# Patient Record
Sex: FEMALE | Race: WHITE | NOT HISPANIC OR LATINO | URBAN - NONMETROPOLITAN AREA
[De-identification: names, ages, dates, MRNs, and addresses within clinical notes are randomized per-mention and may not be internally consistent; named-entity substitution may affect disease eponyms.]

---

## 2019-12-12 LAB
EXTERNAL ABO GROUPING: NORMAL
EXTERNAL ANTIBODY SCREEN: NEGATIVE
EXTERNAL HEPATITIS B SURFACE ANTIGEN: NEGATIVE
EXTERNAL HEPATITIS C AB: NORMAL
EXTERNAL RH FACTOR: POSITIVE
EXTERNAL RUBELLA QUALITATIVE: NORMAL
EXTERNAL SYPHILIS RPR SCREEN: NORMAL
HIV1 P24 AG SERPL QL IA: NORMAL

## 2020-06-23 LAB
EXTERNAL CHLAMYDIA SCREEN: NEGATIVE
EXTERNAL GONORRHEA SCREEN: NEGATIVE
EXTERNAL GROUP B STREP ANTIGEN: NEGATIVE

## 2020-07-13 ENCOUNTER — TRANSCRIBE ORDERS (OUTPATIENT)
Dept: ADMINISTRATIVE | Facility: HOSPITAL | Age: 20
End: 2020-07-13

## 2020-07-13 DIAGNOSIS — Z01.818 PREOP EXAMINATION: Primary | ICD-10-CM

## 2020-07-14 ENCOUNTER — LAB (OUTPATIENT)
Dept: LAB | Facility: HOSPITAL | Age: 20
End: 2020-07-14

## 2020-07-14 DIAGNOSIS — Z01.818 PREOP EXAMINATION: ICD-10-CM

## 2020-07-14 PROCEDURE — C9803 HOPD COVID-19 SPEC COLLECT: HCPCS

## 2020-07-14 PROCEDURE — U0002 COVID-19 LAB TEST NON-CDC: HCPCS

## 2020-07-15 LAB
REF LAB TEST METHOD: NORMAL
SARS-COV-2 RNA RESP QL NAA+PROBE: NOT DETECTED

## 2020-07-16 ENCOUNTER — HOSPITAL ENCOUNTER (INPATIENT)
Facility: HOSPITAL | Age: 20
LOS: 4 days | Discharge: HOME | End: 2020-07-20
Attending: OBSTETRICS & GYNECOLOGY | Admitting: OBSTETRICS & GYNECOLOGY

## 2020-07-16 ENCOUNTER — HOSPITAL ENCOUNTER (INPATIENT)
Dept: LABOR AND DELIVERY | Facility: HOSPITAL | Age: 20
Discharge: HOME | End: 2020-07-16

## 2020-07-16 PROBLEM — Z34.90 TERM PREGNANCY: Status: ACTIVE | Noted: 2020-07-16

## 2020-07-16 LAB
ABO GROUP BLD: NORMAL
BASOPHILS # BLD AUTO: 0.04 10*3/MM3 (ref 0–0.2)
BASOPHILS NFR BLD AUTO: 0.4 % (ref 0–1.5)
BLD GP AB SCN SERPL QL: NEGATIVE
DEPRECATED RDW RBC AUTO: 42.3 FL (ref 37–54)
EOSINOPHIL # BLD AUTO: 0.46 10*3/MM3 (ref 0–0.4)
EOSINOPHIL NFR BLD AUTO: 4.4 % (ref 0.3–6.2)
ERYTHROCYTE [DISTWIDTH] IN BLOOD BY AUTOMATED COUNT: 12.9 % (ref 12.3–15.4)
HCT VFR BLD AUTO: 32.1 % (ref 34–46.6)
HGB BLD-MCNC: 10.5 G/DL (ref 12–15.9)
IMM GRANULOCYTES # BLD AUTO: 0.03 10*3/MM3 (ref 0–0.05)
IMM GRANULOCYTES NFR BLD AUTO: 0.3 % (ref 0–0.5)
LYMPHOCYTES # BLD AUTO: 2.08 10*3/MM3 (ref 0.7–3.1)
LYMPHOCYTES NFR BLD AUTO: 19.9 % (ref 19.6–45.3)
MCH RBC QN AUTO: 29.9 PG (ref 26.6–33)
MCHC RBC AUTO-ENTMCNC: 32.7 G/DL (ref 31.5–35.7)
MCV RBC AUTO: 91.5 FL (ref 79–97)
MONOCYTES # BLD AUTO: 0.97 10*3/MM3 (ref 0.1–0.9)
MONOCYTES NFR BLD AUTO: 9.3 % (ref 5–12)
NEUTROPHILS NFR BLD AUTO: 6.86 10*3/MM3 (ref 1.7–7)
NEUTROPHILS NFR BLD AUTO: 65.7 % (ref 42.7–76)
NRBC BLD AUTO-RTO: 0 /100 WBC (ref 0–0.2)
PLATELET # BLD AUTO: 237 10*3/MM3 (ref 140–450)
PMV BLD AUTO: 12.2 FL (ref 6–12)
RBC # BLD AUTO: 3.51 10*6/MM3 (ref 3.77–5.28)
RH BLD: POSITIVE
T&S EXPIRATION DATE: NORMAL
WBC # BLD AUTO: 10.44 10*3/MM3 (ref 3.4–10.8)

## 2020-07-16 PROCEDURE — 86901 BLOOD TYPING SEROLOGIC RH(D): CPT

## 2020-07-16 PROCEDURE — 86850 RBC ANTIBODY SCREEN: CPT | Performed by: OBSTETRICS & GYNECOLOGY

## 2020-07-16 PROCEDURE — 86901 BLOOD TYPING SEROLOGIC RH(D): CPT | Performed by: OBSTETRICS & GYNECOLOGY

## 2020-07-16 PROCEDURE — 59025 FETAL NON-STRESS TEST: CPT

## 2020-07-16 PROCEDURE — 86900 BLOOD TYPING SEROLOGIC ABO: CPT | Performed by: OBSTETRICS & GYNECOLOGY

## 2020-07-16 PROCEDURE — 86900 BLOOD TYPING SEROLOGIC ABO: CPT

## 2020-07-16 PROCEDURE — 85025 COMPLETE CBC W/AUTO DIFF WBC: CPT | Performed by: OBSTETRICS & GYNECOLOGY

## 2020-07-16 RX ORDER — MINERAL OIL
OIL (ML) MISCELLANEOUS ONCE
Status: DISCONTINUED | OUTPATIENT
Start: 2020-07-16 | End: 2020-07-16

## 2020-07-16 RX ORDER — BUDESONIDE AND FORMOTEROL FUMARATE DIHYDRATE 80; 4.5 UG/1; UG/1
2 AEROSOL RESPIRATORY (INHALATION)
Status: DISCONTINUED | OUTPATIENT
Start: 2020-07-17 | End: 2020-07-18

## 2020-07-16 RX ORDER — GLYCERIN/PROPYLENE GLYCOL/WATR
1 SOLUTION, NON-ORAL VAGINAL AS NEEDED
Status: DISCONTINUED | OUTPATIENT
Start: 2020-07-16 | End: 2020-07-18 | Stop reason: HOSPADM

## 2020-07-16 RX ORDER — TERBUTALINE SULFATE 1 MG/ML
0.2 INJECTION, SOLUTION SUBCUTANEOUS AS NEEDED
Status: DISCONTINUED | OUTPATIENT
Start: 2020-07-16 | End: 2020-07-18 | Stop reason: HOSPADM

## 2020-07-16 RX ORDER — ONDANSETRON 2 MG/ML
4 INJECTION INTRAMUSCULAR; INTRAVENOUS EVERY 6 HOURS PRN
Status: DISCONTINUED | OUTPATIENT
Start: 2020-07-16 | End: 2020-07-18 | Stop reason: HOSPADM

## 2020-07-16 RX ORDER — MISOPROSTOL 100 UG/1
25 TABLET ORAL EVERY 4 HOURS PRN
Status: DISCONTINUED | OUTPATIENT
Start: 2020-07-16 | End: 2020-07-18 | Stop reason: HOSPADM

## 2020-07-16 RX ORDER — BUDESONIDE AND FORMOTEROL FUMARATE DIHYDRATE 80; 4.5 UG/1; UG/1
2 AEROSOL RESPIRATORY (INHALATION)
COMMUNITY

## 2020-07-16 RX ORDER — PRENATAL VIT/IRON FUM/FOLIC AC 27MG-0.8MG
1 TABLET ORAL NIGHTLY
COMMUNITY

## 2020-07-16 RX ORDER — MAGNESIUM HYDROXIDE 1200 MG/15ML
1000 LIQUID ORAL ONCE AS NEEDED
Status: DISCONTINUED | OUTPATIENT
Start: 2020-07-16 | End: 2020-07-18 | Stop reason: HOSPADM

## 2020-07-16 RX ORDER — MONTELUKAST SODIUM 10 MG/1
10 TABLET ORAL NIGHTLY
Status: DISCONTINUED | OUTPATIENT
Start: 2020-07-17 | End: 2020-07-18

## 2020-07-16 RX ORDER — MONTELUKAST SODIUM 10 MG/1
10 TABLET ORAL NIGHTLY
COMMUNITY

## 2020-07-16 RX ORDER — CETIRIZINE HYDROCHLORIDE 10 MG/1
10 TABLET ORAL NIGHTLY
Status: DISCONTINUED | OUTPATIENT
Start: 2020-07-17 | End: 2020-07-18

## 2020-07-16 RX ORDER — PRENATAL VIT/IRON FUM/FOLIC AC 27MG-0.8MG
1 TABLET ORAL NIGHTLY
Status: DISCONTINUED | OUTPATIENT
Start: 2020-07-17 | End: 2020-07-18

## 2020-07-16 RX ORDER — ACETAMINOPHEN 325 MG/1
650 TABLET ORAL EVERY 4 HOURS PRN
Status: DISCONTINUED | OUTPATIENT
Start: 2020-07-16 | End: 2020-07-18

## 2020-07-16 RX ORDER — SODIUM CHLORIDE, SODIUM LACTATE, POTASSIUM CHLORIDE, CALCIUM CHLORIDE 600; 310; 30; 20 MG/100ML; MG/100ML; MG/100ML; MG/100ML
125 INJECTION, SOLUTION INTRAVENOUS CONTINUOUS
Status: DISCONTINUED | OUTPATIENT
Start: 2020-07-16 | End: 2020-07-18

## 2020-07-16 RX ORDER — SODIUM CHLORIDE 0.9 % (FLUSH) 0.9 %
3-10 SYRINGE (ML) INJECTION AS NEEDED
Status: DISCONTINUED | OUTPATIENT
Start: 2020-07-16 | End: 2020-07-18 | Stop reason: HOSPADM

## 2020-07-16 RX ORDER — ONDANSETRON 4 MG/1
4 TABLET, FILM COATED ORAL EVERY 6 HOURS PRN
Status: DISCONTINUED | OUTPATIENT
Start: 2020-07-16 | End: 2020-07-18 | Stop reason: HOSPADM

## 2020-07-16 RX ORDER — CETIRIZINE HYDROCHLORIDE 10 MG/1
10 TABLET ORAL NIGHTLY
COMMUNITY

## 2020-07-16 RX ORDER — OXYTOCIN-SODIUM CHLORIDE 0.9% IV SOLN 30 UNIT/500ML 30-0.9/5 UT/ML-%
2-20 SOLUTION INTRAVENOUS
Status: DISCONTINUED | OUTPATIENT
Start: 2020-07-17 | End: 2020-07-18

## 2020-07-16 RX ORDER — BUTORPHANOL TARTRATE 1 MG/ML
1 INJECTION, SOLUTION INTRAMUSCULAR; INTRAVENOUS
Status: DISCONTINUED | OUTPATIENT
Start: 2020-07-16 | End: 2020-07-18 | Stop reason: HOSPADM

## 2020-07-16 RX ORDER — OXYTOCIN-SODIUM CHLORIDE 0.9% IV SOLN 30 UNIT/500ML 30-0.9/5 UT/ML-%
2-20 SOLUTION INTRAVENOUS
Status: DISCONTINUED | OUTPATIENT
Start: 2020-07-17 | End: 2020-07-16

## 2020-07-16 RX ADMIN — SODIUM CHLORIDE, POTASSIUM CHLORIDE, SODIUM LACTATE AND CALCIUM CHLORIDE 125 ML/HR: 600; 310; 30; 20 INJECTION, SOLUTION INTRAVENOUS at 21:52

## 2020-07-16 RX ADMIN — MISOPROSTOL 25 MCG: 100 TABLET ORAL at 22:20

## 2020-07-17 ENCOUNTER — ANESTHESIA EVENT (OUTPATIENT)
Dept: LABOR AND DELIVERY | Facility: HOSPITAL | Age: 20
End: 2020-07-17

## 2020-07-17 ENCOUNTER — ANESTHESIA (OUTPATIENT)
Dept: LABOR AND DELIVERY | Facility: HOSPITAL | Age: 20
End: 2020-07-17

## 2020-07-17 PROCEDURE — C1755 CATHETER, INTRASPINAL: HCPCS | Performed by: ANESTHESIOLOGY

## 2020-07-17 PROCEDURE — C1755 CATHETER, INTRASPINAL: HCPCS

## 2020-07-17 PROCEDURE — 25010000002 ROPIVACAINE PER 1 MG

## 2020-07-17 PROCEDURE — 94640 AIRWAY INHALATION TREATMENT: CPT

## 2020-07-17 PROCEDURE — 94799 UNLISTED PULMONARY SVC/PX: CPT

## 2020-07-17 RX ORDER — ONDANSETRON 2 MG/ML
4 INJECTION INTRAMUSCULAR; INTRAVENOUS ONCE AS NEEDED
Status: DISCONTINUED | OUTPATIENT
Start: 2020-07-17 | End: 2020-07-18 | Stop reason: HOSPADM

## 2020-07-17 RX ORDER — FAMOTIDINE 10 MG/ML
20 INJECTION, SOLUTION INTRAVENOUS ONCE AS NEEDED
Status: DISCONTINUED | OUTPATIENT
Start: 2020-07-17 | End: 2020-07-18 | Stop reason: HOSPADM

## 2020-07-17 RX ORDER — BUPIVACAINE HYDROCHLORIDE 2.5 MG/ML
INJECTION, SOLUTION EPIDURAL; INFILTRATION; INTRACAUDAL
Status: COMPLETED
Start: 2020-07-17 | End: 2020-07-17

## 2020-07-17 RX ORDER — EPHEDRINE SULFATE 50 MG/ML
10 INJECTION, SOLUTION INTRAVENOUS
Status: DISCONTINUED | OUTPATIENT
Start: 2020-07-17 | End: 2020-07-18 | Stop reason: HOSPADM

## 2020-07-17 RX ORDER — ROPIVACAINE HYDROCHLORIDE 2 MG/ML
INJECTION, SOLUTION EPIDURAL; INFILTRATION; PERINEURAL
Status: COMPLETED
Start: 2020-07-17 | End: 2020-07-17

## 2020-07-17 RX ORDER — BUPIVACAINE HYDROCHLORIDE 2.5 MG/ML
INJECTION, SOLUTION EPIDURAL; INFILTRATION; INTRACAUDAL AS NEEDED
Status: DISCONTINUED | OUTPATIENT
Start: 2020-07-17 | End: 2020-07-20 | Stop reason: SURG

## 2020-07-17 RX ORDER — ROPIVACAINE HYDROCHLORIDE 2 MG/ML
14 INJECTION, SOLUTION EPIDURAL; INFILTRATION; PERINEURAL CONTINUOUS
Status: DISCONTINUED | OUTPATIENT
Start: 2020-07-17 | End: 2020-07-18

## 2020-07-17 RX ADMIN — SODIUM CHLORIDE, POTASSIUM CHLORIDE, SODIUM LACTATE AND CALCIUM CHLORIDE 125 ML/HR: 600; 310; 30; 20 INJECTION, SOLUTION INTRAVENOUS at 05:23

## 2020-07-17 RX ADMIN — SODIUM CHLORIDE, POTASSIUM CHLORIDE, SODIUM LACTATE AND CALCIUM CHLORIDE 125 ML/HR: 600; 310; 30; 20 INJECTION, SOLUTION INTRAVENOUS at 15:46

## 2020-07-17 RX ADMIN — OXYTOCIN 2 MILLI-UNITS/MIN: 10 INJECTION, SOLUTION INTRAMUSCULAR; INTRAVENOUS at 07:30

## 2020-07-17 RX ADMIN — BUPIVACAINE HYDROCHLORIDE 10 ML: 2.5 INJECTION, SOLUTION EPIDURAL; INFILTRATION; INTRACAUDAL; PERINEURAL at 15:26

## 2020-07-17 RX ADMIN — BUDESONIDE AND FORMOTEROL FUMARATE DIHYDRATE 2 PUFF: 80; 4.5 AEROSOL RESPIRATORY (INHALATION) at 06:30

## 2020-07-17 RX ADMIN — ROPIVACAINE HYDROCHLORIDE 14 ML/HR: 2 INJECTION, SOLUTION EPIDURAL; INFILTRATION at 22:42

## 2020-07-17 RX ADMIN — MISOPROSTOL 25 MCG: 100 TABLET ORAL at 02:06

## 2020-07-17 RX ADMIN — SODIUM CHLORIDE, POTASSIUM CHLORIDE, SODIUM LACTATE AND CALCIUM CHLORIDE 125 ML/HR: 600; 310; 30; 20 INJECTION, SOLUTION INTRAVENOUS at 23:38

## 2020-07-17 RX ADMIN — SODIUM CHLORIDE, POTASSIUM CHLORIDE, SODIUM LACTATE AND CALCIUM CHLORIDE 125 ML/HR: 600; 310; 30; 20 INJECTION, SOLUTION INTRAVENOUS at 12:50

## 2020-07-17 NOTE — ANESTHESIA PREPROCEDURE EVALUATION
Anesthesia Evaluation     no history of anesthetic complications:  NPO Solid Status: > 8 hours  NPO Liquid Status: > 8 hours           Airway   Mallampati: II  TM distance: >3 FB  Neck ROM: full  No difficulty expected  Dental - normal exam     Pulmonary - normal exam   (+) asthma,  Cardiovascular - normal exam        Neuro/Psych  GI/Hepatic/Renal/Endo      Musculoskeletal     Abdominal  - normal exam    Bowel sounds: normal.   Substance History      OB/GYN    (+) Pregnant,         Other                        Anesthesia Plan    ASA 2     epidural       Anesthetic plan, all risks, benefits, and alternatives have been provided, discussed and informed consent has been obtained with: patient.

## 2020-07-17 NOTE — PAYOR COMM NOTE
"CONTACT:  MAURICE SOARES MSN, APRN  UTILIZATION MANAGEMENT DEPT.  [facility]  [address], [address]  PHONE:  [phone]  FAX: [phone]    REQUEST FOR INPATIENT AUTHORIZATION.    Lani Allred (20 y.o. Female)     Date of Birth Social Security Number Address Home Phone MRN    [id number]  [id number] [facility] [phone] [id number]    Yarsani Marital Status          None Single       Admission Date Admission Type Admitting Provider Attending Provider Department, Room/Bed    7/16/20 Urgent Jessica Moya, Jessica Duke,  [facility] LABOR DELIVERY, L230/1    Discharge Date Discharge Disposition Discharge Destination                       Attending Provider:  Jessica Moya DO    Allergies:  No Known Allergies    Isolation:  None   Infection:  None   Code Status:  CPR    Ht:  160 cm (63\")   Wt:  77.6 kg (171 lb)    Admission Cmt:  None   Principal Problem:  None                Active Insurance as of 7/16/2020     Primary Coverage     Payor Plan Insurance Group Employer/Plan Group    [employer] [place] [employer]      Payor Plan Address Payor Plan Phone Number Payor Plan Fax Number Effective Dates    [address] [address] [phone]  7/13/2020 - None Entered    [facility] [id number]       Subscriber Name Subscriber Birth Date Member ID       LANI ALLRED [id number]                 Emergency Contacts      (Rel.) Home Phone Work Phone Mobile Phone    Claudia Garcia (Grandparent) [phone] -- --               History & Physical      Yuri Rodríguez III, MD at 07/16/20 1805                Chief complaint   Chief Complaint   Patient presents with   • Scheduled Induction       History of Present Illness: Patient is a 20 y.o. female who presents with IUP at 39w4d weeks gestation. G 1, P 0       Past Medical History:   Diagnosis Date   • Asthma      Blood Type: O +  Fetal Gender: Female  GBS: Negative    Past " Surgical History:   Procedure Laterality Date   • ADENOIDECTOMY     • LAPAROSCOPIC CHOLECYSTECTOMY     • TONSILLECTOMY       Family History   Problem Relation Age of Onset   • No Known Problems Father    • Asthma Mother    • Asthma Brother    • Asthma Sister    • No Known Problems Paternal Grandfather    • No Known Problems Paternal Grandmother    • Cervical cancer Maternal Grandmother    • Lung cancer Maternal Grandfather      Social History     Tobacco Use   • Smoking status: Never Smoker   • Smokeless tobacco: Never Used   Substance Use Topics   • Alcohol use: Never     Frequency: Never   • Drug use: Never     Medications Prior to Admission   Medication Sig Dispense Refill Last Dose   • budesonide-formoterol (SYMBICORT) 80-4.5 MCG/ACT inhaler Inhale 2 puffs 2 (Two) Times a Day.   7/16/2020 at am   • cetirizine (zyrTEC) 10 MG tablet Take 10 mg by mouth Every Night.   7/15/2020 at Unknown time   • montelukast (SINGULAIR) 10 MG tablet Take 10 mg by mouth Every Night.   7/15/2020 at Unknown time   • Prenatal Vit-Fe Fumarate-FA (PRENATAL VITAMIN 27-0.8) 27-0.8 MG tablet tablet Take 1 tablet by mouth Every Night.   7/15/2020 at Unknown time     Allergies:  Patient has no known allergies.      Vital Signs  Temp:  [98.1 °F (36.7 °C)-99 °F (37.2 °C)] 98.1 °F (36.7 °C)  Heart Rate:  [73-93] 73  Resp:  [18] 18  BP: (130-144)/(60-73) 130/60    Radiology  Imaging Results (Last 24 Hours)     ** No results found for the last 24 hours. **          Labs  Lab Results (last 24 hours)     Procedure Component Value Units Date/Time    CBC & Differential [[id number]] Collected:  07/16/20 2154    Specimen:  Blood Updated:  07/16/20 2216    Narrative:       The following orders were created for panel order CBC & Differential.  Procedure                               Abnormality         Status                     ---------                               -----------         ------                     CBC Auto Differential[[id number]]         Abnormal            Final result                 Please view results for these tests on the individual orders.    CBC Auto Differential [[id number]]  (Abnormal) Collected:  07/16/20 2154    Specimen:  Blood Updated:  07/16/20 2216     WBC 10.44 10*3/mm3      RBC 3.51 10*6/mm3      Hemoglobin 10.5 g/dL      Hematocrit 32.1 %      MCV 91.5 fL      MCH 29.9 pg      MCHC 32.7 g/dL      RDW 12.9 %      RDW-SD 42.3 fl      MPV 12.2 fL      Platelets 237 10*3/mm3      Neutrophil % 65.7 %      Lymphocyte % 19.9 %      Monocyte % 9.3 %      Eosinophil % 4.4 %      Basophil % 0.4 %      Immature Grans % 0.3 %      Neutrophils, Absolute 6.86 10*3/mm3      Lymphocytes, Absolute 2.08 10*3/mm3      Monocytes, Absolute 0.97 10*3/mm3      Eosinophils, Absolute 0.46 10*3/mm3      Basophils, Absolute 0.04 10*3/mm3      Immature Grans, Absolute 0.03 10*3/mm3      nRBC 0.0 /100 WBC     Chlamydia trachomatis, Neisseria gonorrhoeae, PCR w/ confirmation - Swab, Vagina [[id number]] Resulted:  06/23/20     Specimen:  Swab from Vagina Updated:  07/16/20 2143     External Chlamydia Screen Negative    Gonorrhea Screen - Swab, [[id number]] Resulted:  06/23/20     Specimen:  Swab Updated:  07/16/20 2143     External Gonorrhea Screen Negative    Hepatitis C Antibody [[id number]] Resulted:  12/12/19     Specimen:  Blood Updated:  07/16/20 2143     External Hepatitis C Ab neg    Hepatitis B Surface Antigen [[id number]] Resulted:  12/12/19     Specimen:  Blood Updated:  07/16/20 2143     External Hepatitis B Surface Ag Negative    RPR [[id number]] Resulted:  12/12/19     Specimen:  Blood Updated:  07/16/20 2143     External RPR Non-Reactive    Rubella Antibody, IgG [[id number]] Resulted:  12/12/19     Specimen:  Blood Updated:  07/16/20 2143     External Rubella Qual Immune    HIV-1 Antibody, EIA [[id number]] Resulted:  12/12/19     Specimen:  Blood Updated:  07/16/20 2143     External HIV Antibody Non-Reactive    Group B Streptococcus Culture - Swab,  Vaginal/Rectum [[id number]] Resulted:  06/23/20     Specimen:  Swab from Vaginal/Rectum Updated:  07/16/20 2143     External Strep Group B Ag Negative            Review of Systems    The following systems were reviewed and negative;  ENT, respiratory, cardiovascular, gastrointestinal, genitourinary, breast, endocrine and allergies / immunologic.      Physical Exam:      General Appearance:    Alert, cooperative, in no acute distress   Head:    Normocephalic, without obvious abnormality, atraumatic   Eyes:            Lids and lashes normal, conjunctivae and sclerae normal, no   icterus, no pallor, corneas clear, PERRLA   Ears:    Ears appear intact with no abnormalities noted   Throat:   No oral lesions, no thrush, oral mucosa moist   Neck:   No adenopathy, supple, trachea midline, no thyromegaly, no     carotid bruit, no JVD   Back:     No kyphosis present, no scoliosis present, no skin lesions,       erythema or scars, no tenderness to percussion or                   palpation,   range of motion normal   Lungs:     Clear to auscultation,respirations regular, even and                   unlabored    Heart:    Regular rhythm and normal rate, normal S1 and S2, no            murmur, no gallop, no rub, no click   Breast Exam:    Deferred   Abdomen:     Normal bowel sounds, no masses, no organomegaly, soft        non-tender, non-distended, no guarding, no rebound                 tenderness   Genitalia:    Cervix: Dilated 1 cm, 40%   Extremities:   Moves all extremities well, no edema, no cyanosis, no              redness   Pulses:   Pulses palpable and equal bilaterally   Skin:   No bleeding, bruising or rash   Lymph nodes:   No palpable adenopathy   Neurologic:   Cranial nerves 2 - 12 grossly intact, sensation intact, DTR        present and equal bilaterally         Assessment: Patient is a 20 y.o. female who presents with IUP at 39w4d weeks gestation. G 1, P 0   Chief Complaint   Patient presents with   • Scheduled  Induction       Plan of Care: Admit. Proceed with augmentation of labor.      Yuri Rodríguez III, MD  07/17/20  08:05      Electronically signed by Yuri Rodríguez III, MD at 07/17/20 0807     H&P signed by [employer], [employer] at 07/16/20 2206      Scan on 7/16/2020 by [employer], [employer]: PRENATAL RECORD, [facility], 7/13/20          Electronically signed by [employer], [employer] at 07/16/20 2206       Vital Signs (last day)     Date/Time   Temp   Temp src   Pulse   Resp   BP   Patient Position   SpO2    07/17/20 1030   97.3 (36.3)   Temporal   --   --   --   --   --    07/17/20 0630   --   --   71   18   --   --   99    07/17/20 0205   --   --   73   18   130/60   --   --    07/17/20 0204   98.1 (36.7)   --   --   --   --   --   --    07/16/20 2200   --   --   --   --   --   --   99    07/16/20 2135   --   --   93   --   144/73   Lying   --    07/16/20 2123   99 (37.2)   Oral   --   18   --   --   --                Facility-Administered Medications as of 7/17/2020   Medication Dose Route Frequency Provider Last Rate Last Dose   • acetaminophen (TYLENOL) tablet 650 mg  650 mg Oral Q4H PRN Jessica Moya DO       • Astroglide gel 1 application  1 bottle Apply externally PRN Jessica Moya DO       • budesonide-formoterol (SYMBICORT) 80-4.5 MCG/ACT inhaler 2 puff  2 puff Inhalation BID - RT Jessica Moya DO   2 puff at 07/17/20 0630   • butorphanol (STADOL) injection 1 mg  1 mg Intravenous Q2H PRN Jessica Moya DO       • butorphanol (STADOL) injection 2 mg  2 mg Intravenous Q3H PRN Jessica Moya DO       • cetirizine (zyrTEC) tablet 10 mg  10 mg Oral Nightly Jessica Moya DO       • lactated ringers bolus 1,000 mL  1,000 mL Intravenous Once PRN Jessica Moya,        • lactated ringers infusion  125 mL/hr Intravenous Continuous Jessica Moya,  125 mL/hr at 07/17/20 0523 125 mL/hr at 07/17/20 0523   •  miSOPROStol (CYTOTEC) tablet 25 mcg  25 mcg Vaginal Q4H PRN Jessica Moya DO   25 mcg at 07/17/20 0206   • montelukast (SINGULAIR) tablet 10 mg  10 mg Oral Nightly Jessica Moya DO       • ondansetron (ZOFRAN) tablet 4 mg  4 mg Oral Q6H PRN Jessica Moya, DO        Or   • ondansetron (ZOFRAN) injection 4 mg  4 mg Intravenous Q6H PRN Jessica Moya DO       • oxytocin in sodium chloride (PITOCIN) 30 UNIT/500ML infusion solution  2-20 earle-units/min Intravenous Titrated Jessica Moya DO 10 mL/hr at 07/17/20 1030 10 earle-units/min at 07/17/20 1030   • prenatal vitamin 27-0.8 tablet 1 tablet  1 tablet Oral Nightly Jessica Moya DO       • sodium chloride (NS) irrigation solution 1,000 mL  1,000 mL Irrigation Once PRN Jessica Moya DO       • sodium chloride 0.9 % flush 3-10 mL  3-10 mL Intravenous PRN Jessica Moya DO       • terbutaline (BRETHINE) injection 0.2 mg  0.2 mg Subcutaneous PRN Jessica Moya DO         Lab Results (all)     Procedure Component Value Units Date/Time    CBC & Differential [[id number]] Collected:  07/16/20 2154    Specimen:  Blood Updated:  07/16/20 2216    Narrative:       The following orders were created for panel order CBC & Differential.  Procedure                               Abnormality         Status                     ---------                               -----------         ------                     CBC Auto Differential[[id number]]        Abnormal            Final result                 Please view results for these tests on the individual orders.    CBC Auto Differential [[id number]]  (Abnormal) Collected:  07/16/20 2154    Specimen:  Blood Updated:  07/16/20 2216     WBC 10.44 10*3/mm3      RBC 3.51 10*6/mm3      Hemoglobin 10.5 g/dL      Hematocrit 32.1 %      MCV 91.5 fL      MCH 29.9 pg      MCHC 32.7 g/dL      RDW 12.9 %      RDW-SD 42.3 fl      MPV 12.2 fL       Platelets 237 10*3/mm3      Neutrophil % 65.7 %      Lymphocyte % 19.9 %      Monocyte % 9.3 %      Eosinophil % 4.4 %      Basophil % 0.4 %      Immature Grans % 0.3 %      Neutrophils, Absolute 6.86 10*3/mm3      Lymphocytes, Absolute 2.08 10*3/mm3      Monocytes, Absolute 0.97 10*3/mm3      Eosinophils, Absolute 0.46 10*3/mm3      Basophils, Absolute 0.04 10*3/mm3      Immature Grans, Absolute 0.03 10*3/mm3      nRBC 0.0 /100 WBC     Chlamydia trachomatis, Neisseria gonorrhoeae, PCR w/ confirmation - Swab, Vagina [[id number]] Resulted:  06/23/20     Specimen:  Swab from Vagina Updated:  07/16/20 2143     External Chlamydia Screen Negative    Gonorrhea Screen - Swab, [[id number]] Resulted:  06/23/20     Specimen:  Swab Updated:  07/16/20 2143     External Gonorrhea Screen Negative    Hepatitis C Antibody [[id number]] Resulted:  12/12/19     Specimen:  Blood Updated:  07/16/20 2143     External Hepatitis C Ab neg    Hepatitis B Surface Antigen [[id number]] Resulted:  12/12/19     Specimen:  Blood Updated:  07/16/20 2143     External Hepatitis B Surface Ag Negative    RPR [[id number]] Resulted:  12/12/19     Specimen:  Blood Updated:  07/16/20 2143     External RPR Non-Reactive    Rubella Antibody, IgG [[id number]] Resulted:  12/12/19     Specimen:  Blood Updated:  07/16/20 2143     External Rubella Qual Immune    HIV-1 Antibody, EIA [[id number]] Resulted:  12/12/19     Specimen:  Blood Updated:  07/16/20 2143     External HIV Antibody Non-Reactive    Group B Streptococcus Culture - Swab, Vaginal/Rectum [[id number]] Resulted:  06/23/20     Specimen:  Swab from Vaginal/Rectum Updated:  07/16/20 2143     External Strep Group B Ag Negative          Imaging Results (All)     None        Orders (all)      Start     Ordered    07/16/20 2131  Code Status and Medical Interventions:  Continuous      07/16/20 2130 07/16/20 2131  Obtain Informed Consent  Once      07/16/20 2130 07/16/20 2131  Insert Peripheral IV  Once       20  Saline Lock & Maintain IV Access  Continuous      20  Confirm Presence of Amniotic Fluid if Patient Presents With SROM  Once      20  Continuous Fetal Monitoring With NST on Admission and Prior to Initiation of Oxytocin.  Per Order Details     Comments:  Continuous Fetal Monitoring With NST on Admission & Prior to Initiation of Oxytocin.    20  External Uterine Contraction Monitoring  Per Hospital Policy      20  Initiate Group Beta Strep (GBS) Prophylaxis Protocol, If Criteria Met  Continuous     Comments:  NO TREATMENT RECOMMENDED IF: 1) Maternal GBS Status Known Negative 2) Scheduled  Birth With Intact Membranes, Not in Labor 3) Maternal GBS Status Unknown, No Risk Factors  TREAT WITH ANTIBIOTICS IF:  1) Maternal GBS Status Known Positive 2) Maternal GBS Status Unknown With Risk Factors: a)  Previous Infant Affected By GBS Infection b) GBS Urinary Tract Infection (UTI) or Bacteriuria During Pregnancy c) Unexplained Maternal Fever (100.4F (38C) or Greater) During Labor d)  Prolonged Rupture of Membranes (18 or More Hours) e) Gestational Age Less Than 37 Weeks    20  Assess Need for Indwelling Urinary Catheter - Follow Removal Protocol  Continuous     Comments:  Indwelling Urinary Catheter Removal Criteria  Discontinue Indwelling Urinary Catheter Unless One of the Following is Present:  Urinary Retention or Obstruction  Chronic Urinary Catheter Use  End of Life  Critical Illness with Strict I/O   Tract or Abdominal Surgery  Stage 3/4 Sacral / Perineal Wound  Required Activity Restriction: Trauma  Required Activity Restriction: Spine Surgery  If Patient is Being Followed by Urology Contact Them PRIOR to Removal  Do Not Remove Indwelling Urinary Catheter Order is Present with a CLINICAL REASON to Maintain the Catheter. Provider is  Required to Include a Clinical Reason to Maintain a Urinary Catheter    Patient Admitted With Indwelling Urinary Catheter (Not Placed at [facility])  Assess for Continued Need & Document Medical Necessity  If Infection is Suspected, Contact the Provider        07/16/20 2130 07/16/20 2131  Urinary Catheter Care  Every Shift      07/16/20 2130 07/16/20 2131  May Ambulate if Membranes Intact or Head Engaged With Ruptured BOW or Normal Tracing for 20 Minutes  Until Discontinued      07/16/20 2130 07/16/20 2131  Mini-Prep Prior to Delivery  Once      07/16/20 2130 07/16/20 2131  Notify Provider (Specified)  Until Discontinued      07/16/20 2130 07/16/20 2131  Notify Provider if Membranes Ruptured, Bleeding Greater Than 1 Pad Per Hour, Fetal Heart Tone Abnormality or Severe Pain  Until Discontinued      07/16/20 2130 07/16/20 2131  Notify Provider of Tachysystole (Per Hospital Algorithm)  Until Discontinued      07/16/20 2130 07/16/20 2131  NPO Diet NPO Except: Ice Chips  Diet Effective Now      07/16/20 2130 07/16/20 2131  Type & Screen  STAT      07/16/20 2130 07/16/20 2131  Vital Signs Per Hospital Policy  Per Hospital Policy      07/16/20 2130 07/16/20 2130  Admit To Obstetrics Inpatient  Once      07/16/20 2129                  FHR B (since admission)     None        Uterine Activity (since admission)     Date/Time Method Contraction Frequency (Minutes) Contraction Duration (sec) Contraction Intensity Uterine Resting Tone Contraction Pattern    07/17/20 1100  external tocotransducer;palpation  2-5  30-60  --  --  --    07/17/20 1045  external tocotransducer;palpation  2-5  30-60  mild by palpation  soft by palpation  --    07/17/20 1030  external tocotransducer  2-3  30-60  --  --  --    07/17/20 1015  external tocotransducer  2-3  30-60  --  --  --    07/17/20 1000  external tocotransducer  2-3  30-60  --  --  --    07/17/20 0945  external tocotransducer  2  40  --  --  --     07/17/20 0930  external tocotransducer  2-3  40-60  --  --  --    07/17/20 0915  external tocotransducer  -- EFM adjusted  --  --  --  --    07/17/20 0900  external tocotransducer  1.5-3  40-60  mild by palpation  soft by palpation  --    07/17/20 0845  external tocotransducer  1.5-2  60  mild by palpation  soft by palpation  --    07/17/20 0830  external tocotransducer  1-3  40-60  --  --  --    07/17/20 0815  external tocotransducer  2.5-5  40-60  --  --  --    07/17/20 0800  external tocotransducer  1.5-2  40-60  mild by palpation  soft by palpation  --    07/17/20 0745  external tocotransducer  --  --  --  --  Irregular    07/17/20 0725  external tocotransducer  --  --  --  --  --    07/17/20 0600  external tocotransducer  --  --  mild by palpation  --  Irritability    07/17/20 0500  external tocotransducer  --  --  mild by palpation  --  Irregular    07/17/20 0400  external tocotransducer  --  --  mild by palpation  --  Irregular    07/17/20 0300  external tocotransducer  --  --  mild by palpation  --  Irregular    07/17/20 0200  external tocotransducer  --  --  mild by palpation  --  Irregular    07/17/20 0100  external tocotransducer  --  --  mild by palpation  --  Irregular    07/17/20 0000  external tocotransducer  --  --  mild by palpation  --  Irregular    07/16/20 2300  external tocotransducer  --  --  mild by palpation  --  Irregular    07/16/20 2200  external tocotransducer  --  --  mild by palpation  --  Irregular

## 2020-07-17 NOTE — ANESTHESIA PROCEDURE NOTES
Labor Epidural    Pre-sedation assessment completed: 7/17/2020 3:16 PM    Patient location during procedure: pre-op  Start Time: 7/17/2020 3:16 PM  Indication:at surgeon's request  Performed By  Anesthesiologist: Anthony Melara MD  Preanesthetic Checklist  Completed: patient identified, site marked, surgical consent, pre-op evaluation, timeout performed, IV checked, risks and benefits discussed and monitors and equipment checked  Prep:  Pt Position:sitting  Sterile Tech:gloves, mask, sterile barrier and cap  Prep:povidone-iodine 7.5% surgical scrub  Monitoring:blood pressure monitoring  Epidural Block Procedure:  Approach:midline  Guidance:landmark technique and palpation technique  Location:L3-L4  Needle Type:Tuohy  Needle Gauge:17 G  Loss of Resistance Medium: air  Loss of Resistance: 7cm  Cath Depth at skin:9 cm  Paresthesia: none  Aspiration:negative  Test Dose:negative  Number of Attempts: 1  Post Assessment:  Dressing:occlusive dressing applied and secured with tape  Pt Tolerance:patient tolerated the procedure well with no apparent complications  Complications:no

## 2020-07-17 NOTE — H&P
Chief complaint   Chief Complaint   Patient presents with   • Scheduled Induction       History of Present Illness: Patient is a 20 y.o. female who presents with IUP at 39w4d weeks gestation. G 1, P 0       Past Medical History:   Diagnosis Date   • Asthma      Blood Type: O +  Fetal Gender: Female  GBS: Negative    Past Surgical History:   Procedure Laterality Date   • ADENOIDECTOMY     • LAPAROSCOPIC CHOLECYSTECTOMY     • TONSILLECTOMY       Family History   Problem Relation Age of Onset   • No Known Problems Father    • Asthma Mother    • Asthma Brother    • Asthma Sister    • No Known Problems Paternal Grandfather    • No Known Problems Paternal Grandmother    • Cervical cancer Maternal Grandmother    • Lung cancer Maternal Grandfather      Social History     Tobacco Use   • Smoking status: Never Smoker   • Smokeless tobacco: Never Used   Substance Use Topics   • Alcohol use: Never     Frequency: Never   • Drug use: Never     Medications Prior to Admission   Medication Sig Dispense Refill Last Dose   • budesonide-formoterol (SYMBICORT) 80-4.5 MCG/ACT inhaler Inhale 2 puffs 2 (Two) Times a Day.   7/16/2020 at am   • cetirizine (zyrTEC) 10 MG tablet Take 10 mg by mouth Every Night.   7/15/2020 at Unknown time   • montelukast (SINGULAIR) 10 MG tablet Take 10 mg by mouth Every Night.   7/15/2020 at Unknown time   • Prenatal Vit-Fe Fumarate-FA (PRENATAL VITAMIN 27-0.8) 27-0.8 MG tablet tablet Take 1 tablet by mouth Every Night.   7/15/2020 at Unknown time     Allergies:  Patient has no known allergies.      Vital Signs  Temp:  [98.1 °F (36.7 °C)-99 °F (37.2 °C)] 98.1 °F (36.7 °C)  Heart Rate:  [73-93] 73  Resp:  [18] 18  BP: (130-144)/(60-73) 130/60    Radiology  Imaging Results (Last 24 Hours)     ** No results found for the last 24 hours. **          Labs  Lab Results (last 24 hours)     Procedure Component Value Units Date/Time    CBC & Differential [[id number]] Collected:  07/16/20 2154    Specimen:  Blood  Updated:  07/16/20 2216    Narrative:       The following orders were created for panel order CBC & Differential.  Procedure                               Abnormality         Status                     ---------                               -----------         ------                     CBC Auto Differential[[id number]]        Abnormal            Final result                 Please view results for these tests on the individual orders.    CBC Auto Differential [[id number]]  (Abnormal) Collected:  07/16/20 2154    Specimen:  Blood Updated:  07/16/20 2216     WBC 10.44 10*3/mm3      RBC 3.51 10*6/mm3      Hemoglobin 10.5 g/dL      Hematocrit 32.1 %      MCV 91.5 fL      MCH 29.9 pg      MCHC 32.7 g/dL      RDW 12.9 %      RDW-SD 42.3 fl      MPV 12.2 fL      Platelets 237 10*3/mm3      Neutrophil % 65.7 %      Lymphocyte % 19.9 %      Monocyte % 9.3 %      Eosinophil % 4.4 %      Basophil % 0.4 %      Immature Grans % 0.3 %      Neutrophils, Absolute 6.86 10*3/mm3      Lymphocytes, Absolute 2.08 10*3/mm3      Monocytes, Absolute 0.97 10*3/mm3      Eosinophils, Absolute 0.46 10*3/mm3      Basophils, Absolute 0.04 10*3/mm3      Immature Grans, Absolute 0.03 10*3/mm3      nRBC 0.0 /100 WBC     Chlamydia trachomatis, Neisseria gonorrhoeae, PCR w/ confirmation - Swab, Vagina [[id number]] Resulted:  06/23/20     Specimen:  Swab from Vagina Updated:  07/16/20 2143     External Chlamydia Screen Negative    Gonorrhea Screen - Swab, [[id number]] Resulted:  06/23/20     Specimen:  Swab Updated:  07/16/20 2143     External Gonorrhea Screen Negative    Hepatitis C Antibody [[id number]] Resulted:  12/12/19     Specimen:  Blood Updated:  07/16/20 2143     External Hepatitis C Ab neg    Hepatitis B Surface Antigen [[id number]] Resulted:  12/12/19     Specimen:  Blood Updated:  07/16/20 2143     External Hepatitis B Surface Ag Negative    RPR [[id number]] Resulted:  12/12/19     Specimen:  Blood Updated:  07/16/20 2143     External RPR  Non-Reactive    Rubella Antibody, IgG [[id number]] Resulted:  12/12/19     Specimen:  Blood Updated:  07/16/20 2143     External Rubella Qual Immune    HIV-1 Antibody, EIA [[id number]] Resulted:  12/12/19     Specimen:  Blood Updated:  07/16/20 2143     External HIV Antibody Non-Reactive    Group B Streptococcus Culture - Swab, Vaginal/Rectum [[id number]] Resulted:  06/23/20     Specimen:  Swab from Vaginal/Rectum Updated:  07/16/20 2143     External Strep Group B Ag Negative            Review of Systems    The following systems were reviewed and negative;  ENT, respiratory, cardiovascular, gastrointestinal, genitourinary, breast, endocrine and allergies / immunologic.      Physical Exam:      General Appearance:    Alert, cooperative, in no acute distress   Head:    Normocephalic, without obvious abnormality, atraumatic   Eyes:            Lids and lashes normal, conjunctivae and sclerae normal, no   icterus, no pallor, corneas clear, PERRLA   Ears:    Ears appear intact with no abnormalities noted   Throat:   No oral lesions, no thrush, oral mucosa moist   Neck:   No adenopathy, supple, trachea midline, no thyromegaly, no     carotid bruit, no JVD   Back:     No kyphosis present, no scoliosis present, no skin lesions,       erythema or scars, no tenderness to percussion or                   palpation,   range of motion normal   Lungs:     Clear to auscultation,respirations regular, even and                   unlabored    Heart:    Regular rhythm and normal rate, normal S1 and S2, no            murmur, no gallop, no rub, no click   Breast Exam:    Deferred   Abdomen:     Normal bowel sounds, no masses, no organomegaly, soft        non-tender, non-distended, no guarding, no rebound                 tenderness   Genitalia:    Cervix: Dilated 1 cm, 40%   Extremities:   Moves all extremities well, no edema, no cyanosis, no              redness   Pulses:   Pulses palpable and equal bilaterally   Skin:   No bleeding, bruising  or rash   Lymph nodes:   No palpable adenopathy   Neurologic:   Cranial nerves 2 - 12 grossly intact, sensation intact, DTR        present and equal bilaterally         Assessment: Patient is a 20 y.o. female who presents with IUP at 39w4d weeks gestation. G 1, P 0   Chief Complaint   Patient presents with   • Scheduled Induction       Plan of Care: Admit. Proceed with augmentation of labor.      Yuri Rodríguez III, MD  07/17/20  08:05

## 2020-07-17 NOTE — NON STRESS TEST
Lani Allred, a  at 39w3d with an LEIGH ANN of 2020, by Ultrasound, was seen at [facility] LABOR DELIVERY for a nonstress test.    No chief complaint on file.      Patient Active Problem List   Diagnosis   • Term pregnancy       Start Time:   Stop Time:     Interpretation A  Nonstress Test Interpretation A: Reactive (20 : Myesha Rich RN)

## 2020-07-18 PROCEDURE — 25010000002 ONDANSETRON PER 1 MG: Performed by: OBSTETRICS & GYNECOLOGY

## 2020-07-18 PROCEDURE — 25010000002 DIPHENHYDRAMINE PER 50 MG: Performed by: OBSTETRICS & GYNECOLOGY

## 2020-07-18 PROCEDURE — 0UQMXZZ REPAIR VULVA, EXTERNAL APPROACH: ICD-10-PCS | Performed by: OBSTETRICS & GYNECOLOGY

## 2020-07-18 PROCEDURE — 25010000002 ROPIVACAINE PER 1 MG: Performed by: ANESTHESIOLOGY

## 2020-07-18 PROCEDURE — 25010000002 METHYLERGONOVINE MALEATE PER 0.2 MG

## 2020-07-18 RX ORDER — METHYLERGONOVINE MALEATE 0.2 MG/ML
INJECTION INTRAVENOUS
Status: COMPLETED
Start: 2020-07-18 | End: 2020-07-18

## 2020-07-18 RX ORDER — HYDROCORTISONE ACETATE PRAMOXINE HCL 1; 1 G/100G; G/100G
1 CREAM TOPICAL AS NEEDED
Status: DISCONTINUED | OUTPATIENT
Start: 2020-07-18 | End: 2020-07-20 | Stop reason: HOSPADM

## 2020-07-18 RX ORDER — CARBOPROST TROMETHAMINE 250 UG/ML
250 INJECTION, SOLUTION INTRAMUSCULAR AS NEEDED
Status: DISCONTINUED | OUTPATIENT
Start: 2020-07-18 | End: 2020-07-18 | Stop reason: HOSPADM

## 2020-07-18 RX ORDER — DIPHENHYDRAMINE HYDROCHLORIDE 50 MG/ML
25 INJECTION INTRAMUSCULAR; INTRAVENOUS EVERY 6 HOURS PRN
Status: DISCONTINUED | OUTPATIENT
Start: 2020-07-18 | End: 2020-07-18

## 2020-07-18 RX ORDER — OXYTOCIN-SODIUM CHLORIDE 0.9% IV SOLN 30 UNIT/500ML 30-0.9/5 UT/ML-%
125 SOLUTION INTRAVENOUS CONTINUOUS PRN
Status: DISCONTINUED | OUTPATIENT
Start: 2020-07-18 | End: 2020-07-18 | Stop reason: HOSPADM

## 2020-07-18 RX ORDER — ONDANSETRON 4 MG/1
4 TABLET, FILM COATED ORAL EVERY 6 HOURS PRN
Status: DISCONTINUED | OUTPATIENT
Start: 2020-07-18 | End: 2020-07-20 | Stop reason: HOSPADM

## 2020-07-18 RX ORDER — ACETAMINOPHEN 325 MG/1
650 TABLET ORAL EVERY 4 HOURS PRN
Status: DISCONTINUED | OUTPATIENT
Start: 2020-07-18 | End: 2020-07-18 | Stop reason: HOSPADM

## 2020-07-18 RX ORDER — DOCUSATE SODIUM 100 MG/1
100 CAPSULE, LIQUID FILLED ORAL 2 TIMES DAILY
Status: DISCONTINUED | OUTPATIENT
Start: 2020-07-18 | End: 2020-07-20 | Stop reason: HOSPADM

## 2020-07-18 RX ORDER — HYDROCORTISONE 25 MG/G
1 CREAM TOPICAL AS NEEDED
Status: DISCONTINUED | OUTPATIENT
Start: 2020-07-18 | End: 2020-07-20 | Stop reason: HOSPADM

## 2020-07-18 RX ORDER — LANOLIN 100 %
OINTMENT (GRAM) TOPICAL
Status: DISCONTINUED | OUTPATIENT
Start: 2020-07-18 | End: 2020-07-20 | Stop reason: HOSPADM

## 2020-07-18 RX ORDER — HYDROXYZINE 50 MG/1
50 TABLET, FILM COATED ORAL NIGHTLY PRN
Status: DISCONTINUED | OUTPATIENT
Start: 2020-07-18 | End: 2020-07-20 | Stop reason: HOSPADM

## 2020-07-18 RX ORDER — HYDROCODONE BITARTRATE AND ACETAMINOPHEN 5; 325 MG/1; MG/1
1 TABLET ORAL EVERY 4 HOURS PRN
Status: DISCONTINUED | OUTPATIENT
Start: 2020-07-18 | End: 2020-07-18 | Stop reason: HOSPADM

## 2020-07-18 RX ORDER — OXYTOCIN-SODIUM CHLORIDE 0.9% IV SOLN 30 UNIT/500ML 30-0.9/5 UT/ML-%
999 SOLUTION INTRAVENOUS ONCE
Status: COMPLETED | OUTPATIENT
Start: 2020-07-18 | End: 2020-07-18

## 2020-07-18 RX ORDER — CALCIUM CARBONATE 200(500)MG
2 TABLET,CHEWABLE ORAL 3 TIMES DAILY PRN
Status: DISCONTINUED | OUTPATIENT
Start: 2020-07-18 | End: 2020-07-18

## 2020-07-18 RX ORDER — OXYTOCIN-SODIUM CHLORIDE 0.9% IV SOLN 30 UNIT/500ML 30-0.9/5 UT/ML-%
250 SOLUTION INTRAVENOUS CONTINUOUS
Status: ACTIVE | OUTPATIENT
Start: 2020-07-18 | End: 2020-07-18

## 2020-07-18 RX ORDER — ONDANSETRON 2 MG/ML
4 INJECTION INTRAMUSCULAR; INTRAVENOUS EVERY 6 HOURS PRN
Status: DISCONTINUED | OUTPATIENT
Start: 2020-07-18 | End: 2020-07-20 | Stop reason: HOSPADM

## 2020-07-18 RX ORDER — CARBOPROST TROMETHAMINE 250 UG/ML
250 INJECTION, SOLUTION INTRAMUSCULAR
Status: DISCONTINUED | OUTPATIENT
Start: 2020-07-18 | End: 2020-07-20 | Stop reason: HOSPADM

## 2020-07-18 RX ORDER — IBUPROFEN 800 MG/1
800 TABLET ORAL EVERY 8 HOURS SCHEDULED
Status: DISCONTINUED | OUTPATIENT
Start: 2020-07-18 | End: 2020-07-20 | Stop reason: HOSPADM

## 2020-07-18 RX ORDER — SODIUM CHLORIDE 0.9 % (FLUSH) 0.9 %
1-10 SYRINGE (ML) INJECTION AS NEEDED
Status: DISCONTINUED | OUTPATIENT
Start: 2020-07-18 | End: 2020-07-20 | Stop reason: HOSPADM

## 2020-07-18 RX ORDER — IBUPROFEN 800 MG/1
800 TABLET ORAL EVERY 8 HOURS SCHEDULED
Status: DISCONTINUED | OUTPATIENT
Start: 2020-07-18 | End: 2020-07-18 | Stop reason: HOSPADM

## 2020-07-18 RX ORDER — HYDROCODONE BITARTRATE AND ACETAMINOPHEN 5; 325 MG/1; MG/1
1 TABLET ORAL EVERY 4 HOURS PRN
Status: DISCONTINUED | OUTPATIENT
Start: 2020-07-18 | End: 2020-07-20 | Stop reason: HOSPADM

## 2020-07-18 RX ORDER — METHYLERGONOVINE MALEATE 0.2 MG/ML
200 INJECTION INTRAVENOUS ONCE AS NEEDED
Status: COMPLETED | OUTPATIENT
Start: 2020-07-18 | End: 2020-07-18

## 2020-07-18 RX ORDER — METHYLERGONOVINE MALEATE 0.2 MG/ML
200 INJECTION INTRAVENOUS ONCE AS NEEDED
Status: DISCONTINUED | OUTPATIENT
Start: 2020-07-18 | End: 2020-07-20 | Stop reason: HOSPADM

## 2020-07-18 RX ORDER — BISACODYL 10 MG
10 SUPPOSITORY, RECTAL RECTAL DAILY PRN
Status: DISCONTINUED | OUTPATIENT
Start: 2020-07-19 | End: 2020-07-20 | Stop reason: HOSPADM

## 2020-07-18 RX ORDER — MISOPROSTOL 100 UG/1
600 TABLET ORAL ONCE AS NEEDED
Status: DISCONTINUED | OUTPATIENT
Start: 2020-07-18 | End: 2020-07-20 | Stop reason: HOSPADM

## 2020-07-18 RX ORDER — MISOPROSTOL 100 UG/1
600 TABLET ORAL AS NEEDED
Status: DISCONTINUED | OUTPATIENT
Start: 2020-07-18 | End: 2020-07-18 | Stop reason: HOSPADM

## 2020-07-18 RX ADMIN — Medication 2 TABLET: at 03:56

## 2020-07-18 RX ADMIN — METHYLERGONOVINE MALEATE 200 MCG: 0.2 INJECTION, SOLUTION INTRAMUSCULAR; INTRAVENOUS at 04:39

## 2020-07-18 RX ADMIN — DOCUSATE SODIUM 100 MG: 100 CAPSULE ORAL at 21:37

## 2020-07-18 RX ADMIN — Medication: at 21:37

## 2020-07-18 RX ADMIN — IBUPROFEN 800 MG: 800 TABLET, FILM COATED ORAL at 21:37

## 2020-07-18 RX ADMIN — DOCUSATE SODIUM 100 MG: 100 CAPSULE ORAL at 09:39

## 2020-07-18 RX ADMIN — IBUPROFEN 800 MG: 800 TABLET, FILM COATED ORAL at 14:02

## 2020-07-18 RX ADMIN — DIPHENHYDRAMINE HYDROCHLORIDE 25 MG: 50 INJECTION, SOLUTION INTRAMUSCULAR; INTRAVENOUS at 02:16

## 2020-07-18 RX ADMIN — IBUPROFEN 800 MG: 800 TABLET, FILM COATED ORAL at 05:52

## 2020-07-18 RX ADMIN — ROPIVACAINE HYDROCHLORIDE 14 ML/HR: 2 INJECTION, SOLUTION EPIDURAL; INFILTRATION at 03:20

## 2020-07-18 RX ADMIN — METHYLERGONOVINE MALEATE 200 MCG: 0.2 INJECTION INTRAVENOUS at 04:39

## 2020-07-18 RX ADMIN — Medication 1 APPLICATION: at 04:03

## 2020-07-18 RX ADMIN — ONDANSETRON 4 MG: 2 INJECTION INTRAMUSCULAR; INTRAVENOUS at 00:25

## 2020-07-18 RX ADMIN — OXYTOCIN 999 ML/HR: 10 INJECTION, SOLUTION INTRAMUSCULAR; INTRAVENOUS at 04:28

## 2020-07-18 RX ADMIN — Medication: at 09:39

## 2020-07-18 NOTE — PLAN OF CARE
Problem: Postpartum (Vaginal Delivery) (Adult,Obstetrics,Pediatric)  Goal: Signs and Symptoms of Listed Potential Problems Will be Absent, Minimized or Managed (Postpartum)  Flowsheets (Taken 7/18/2020 6653)  Problems Assessed (Postpartum Vaginal Delivery): all  Problems Present (Postpartum Vag Deliv): none  Note:   Patient is doing well, firm, small lochia, motrin only for pain.

## 2020-07-18 NOTE — L&D DELIVERY NOTE
Vaginal Delivery Procedure Note    Lani Allred  Gestational Age-39.5 weeks        OBGYN: Jessica Moya DO      Pre-op Diagnosis:   Pt 19 y/o G1 @ 39.5 weeks for IOL      Anesthesia: Epidural        Detailed Description of Procedure     The patient was prepped and draped in normal sterile fashion. The head was delivered without difficulty. There was a nuchal cord x 1. Anterior and posterior shoulders delivered without any problems. The rest of the infant was delivered in controlled fashion.The infant was bulb suctioned at delivery. The placenta delivered intact. The patient tolerated the procedure well and went to the recovery room in stable condition.        Time of delivery: 428  Maternal Blood Type: O Positive  Fetal Gender: Female  Nuchal Cord: x 1  Tears: 1st deg right labial and 1st deg left vaginal lacerations   Blood Cord: Yes  Estimated Blood Loss: 300cc  Placenta: Spontaneous, Delivered Intact   APGARS:  8    9            Disposition: Transfer to Women's Health Floor  Condition: Stable    Jessica Moya DO     Date: 2020  Time: 05:27

## 2020-07-18 NOTE — PLAN OF CARE
Problem: Patient Care Overview  Goal: Plan of Care Review  Outcome: Ongoing (interventions implemented as appropriate)  Flowsheets  Taken 7/18/2020 0430 by Myesha Rich RN  Progress: improving  Taken 7/18/2020 1425 by Fidelina Estrada RN  Plan of Care Reviewed With: patient  Goal: Individualization and Mutuality  Outcome: Ongoing (interventions implemented as appropriate)  Goal: Discharge Needs Assessment  Outcome: Ongoing (interventions implemented as appropriate)  Goal: Interprofessional Rounds/Family Conf  Outcome: Ongoing (interventions implemented as appropriate)

## 2020-07-19 LAB
BASOPHILS # BLD AUTO: 0.06 10*3/MM3 (ref 0–0.2)
BASOPHILS NFR BLD AUTO: 0.4 % (ref 0–1.5)
DEPRECATED RDW RBC AUTO: 44.2 FL (ref 37–54)
EOSINOPHIL # BLD AUTO: 0.69 10*3/MM3 (ref 0–0.4)
EOSINOPHIL NFR BLD AUTO: 4.5 % (ref 0.3–6.2)
ERYTHROCYTE [DISTWIDTH] IN BLOOD BY AUTOMATED COUNT: 13.1 % (ref 12.3–15.4)
HCT VFR BLD AUTO: 27.9 % (ref 34–46.6)
HGB BLD-MCNC: 8.9 G/DL (ref 12–15.9)
IMM GRANULOCYTES # BLD AUTO: 0.08 10*3/MM3 (ref 0–0.05)
IMM GRANULOCYTES NFR BLD AUTO: 0.5 % (ref 0–0.5)
LYMPHOCYTES # BLD AUTO: 2.45 10*3/MM3 (ref 0.7–3.1)
LYMPHOCYTES NFR BLD AUTO: 15.9 % (ref 19.6–45.3)
MCH RBC QN AUTO: 29.8 PG (ref 26.6–33)
MCHC RBC AUTO-ENTMCNC: 31.9 G/DL (ref 31.5–35.7)
MCV RBC AUTO: 93.3 FL (ref 79–97)
MONOCYTES # BLD AUTO: 1.33 10*3/MM3 (ref 0.1–0.9)
MONOCYTES NFR BLD AUTO: 8.6 % (ref 5–12)
NEUTROPHILS NFR BLD AUTO: 10.82 10*3/MM3 (ref 1.7–7)
NEUTROPHILS NFR BLD AUTO: 70.1 % (ref 42.7–76)
NRBC BLD AUTO-RTO: 0 /100 WBC (ref 0–0.2)
PLATELET # BLD AUTO: 218 10*3/MM3 (ref 140–450)
PMV BLD AUTO: 12.1 FL (ref 6–12)
RBC # BLD AUTO: 2.99 10*6/MM3 (ref 3.77–5.28)
WBC # BLD AUTO: 15.43 10*3/MM3 (ref 3.4–10.8)

## 2020-07-19 PROCEDURE — 85025 COMPLETE CBC W/AUTO DIFF WBC: CPT | Performed by: OBSTETRICS & GYNECOLOGY

## 2020-07-19 RX ADMIN — DOCUSATE SODIUM 100 MG: 100 CAPSULE ORAL at 21:24

## 2020-07-19 RX ADMIN — IBUPROFEN 800 MG: 800 TABLET, FILM COATED ORAL at 15:39

## 2020-07-19 RX ADMIN — IBUPROFEN 800 MG: 800 TABLET, FILM COATED ORAL at 06:19

## 2020-07-19 RX ADMIN — DOCUSATE SODIUM 100 MG: 100 CAPSULE ORAL at 08:19

## 2020-07-19 RX ADMIN — IBUPROFEN 800 MG: 800 TABLET, FILM COATED ORAL at 21:24

## 2020-07-19 NOTE — PLAN OF CARE
Problem: Patient Care Overview  Goal: Plan of Care Review  Outcome: Ongoing (interventions implemented as appropriate)  Flowsheets  Taken 7/18/2020 0430 by Myesha Rich RN  Progress: improving  Taken 7/19/2020 0745 by Kay Mario RN  Plan of Care Reviewed With: patient  Taken 7/19/2020 1546 by Kay Mario RN  Outcome Summary: Patient doing well, vital signs stable, lochia light with no odor or clots, pain controlled with motrin and prn meds, patient ambulating in room and to the nursery, patient voiding well, kris continue postpartum care

## 2020-07-19 NOTE — PROGRESS NOTES
" [facility]  Vaginal Delivery Progress Note    Subjective   Subjective  Postpartum Day 1: Vaginal Delivery    The patient feels well.  Her pain is well controlled with nonsteroidal anti-inflammatory drugs.   She is ambulating well.  Patient describes her bleeding as moderate lochia.    Breastfeeding: infant latching.    Objective     Objective   Vital Signs Range for the last 24 hours  Temperature: Temp:  [97.6 °F (36.4 °C)-98.1 °F (36.7 °C)] 97.6 °F (36.4 °C)   Temp Source: Temp src: Oral   BP: BP: (113-129)/(61-73) 113/61   Pulse: Heart Rate:  [80-93] 80   Respirations: Resp:  [18] 18   Weight:       Admit Height:  Height: 160 cm (63\")    Physical Exam:  General:  no acute distresss.  Abdomen: Fundus: appropriate, firm, non tender  Extremities: normal, atraumatic, no cyanosis, and trace edema.     [unfilled]       Lab Results   Component Value Date    ABO O 07/16/2020    RH Positive 07/16/2020        Lab Results   Component Value Date    HGB 8.9 (L) 07/19/2020    HCT 27.9 (L) 07/19/2020         Assessment/Plan   Active Problems:    Term pregnancy      Lani Allred is Day 1  post-partum  Vaginal, Spontaneous    .      Plan:  Continue current care.      Jessica Moya DO  7/19/2020  11:59    "

## 2020-07-19 NOTE — PLAN OF CARE
Problem: Patient Care Overview  Goal: Plan of Care Review  Outcome: Ongoing (interventions implemented as appropriate)  Flowsheets  Taken 7/18/2020 0430 by Myesha Rich, RN  Progress: improving  Taken 7/18/2020 2000 by Elia Mejia, RN  Plan of Care Reviewed With: patient  Note:   Fundus firm small rubra

## 2020-07-20 VITALS
WEIGHT: 171 LBS | SYSTOLIC BLOOD PRESSURE: 116 MMHG | RESPIRATION RATE: 18 BRPM | BODY MASS INDEX: 30.3 KG/M2 | HEART RATE: 87 BPM | DIASTOLIC BLOOD PRESSURE: 68 MMHG | TEMPERATURE: 98.2 F | OXYGEN SATURATION: 98 % | HEIGHT: 63 IN

## 2020-07-20 RX ORDER — IBUPROFEN 800 MG/1
800 TABLET ORAL EVERY 8 HOURS SCHEDULED
Qty: 60 TABLET | Refills: 0 | Status: SHIPPED | OUTPATIENT
Start: 2020-07-20

## 2020-07-20 RX ADMIN — IBUPROFEN 800 MG: 800 TABLET, FILM COATED ORAL at 05:17

## 2020-07-20 RX ADMIN — DOCUSATE SODIUM 100 MG: 100 CAPSULE ORAL at 08:13

## 2020-07-20 NOTE — PAYOR COMM NOTE
"CONTACT:  MAURICE SOARES MSN, APRN  UTILIZATION MANAGEMENT DEPT.  [facility]  [address], [address]  PHONE:  [phone]  FAX: [phone]    PATIENT AND INFANT DISCHARGED TO HOME ON 7/20/2020    REFER TO AUTH # [id number]    Lani Love (20 y.o. Female)     Date of Birth Social Security Number Address Home Phone MRN    [id number]  [address] [address] [phone] [id number]    Gnosticist Marital Status          None Single       Admission Date Admission Type Admitting Provider Attending Provider Department, Room/Bed    7/16/20 Urgent Jessica Moya DO  [facility], W242/1    Discharge Date Discharge Disposition Discharge Destination        7/20/2020 Home or Self Care              Attending Provider:  (none)   Allergies:  No Known Allergies    Isolation:  None   Infection:  None   Code Status:  CPR    Ht:  160 cm (63\")   Wt:  77.6 kg (171 lb)    Admission Cmt:  None   Principal Problem:  None                Active Insurance as of 7/16/2020     Primary Coverage     Payor Plan Insurance Group Employer/Plan Group    [employer] [place] [employer] [place]ICA[place]      Payor Plan Address Payor Plan Phone Number Payor Plan Fax Number Effective Dates    [address] [address] [phone]  7/13/2020 - None Entered    [facility] [id number]       Subscriber Name Subscriber Birth Date Member ID       LANI LOVE 2000 [id number]                 Emergency Contacts      (Rel.) Home Phone Work Phone Mobile Phone    JoseClaudia (Grandparent) [phone] -- --               Discharge Summary      Jessica Moya DO at 07/20/20 1021          [facility]  Delivery Discharge Summary    Primary OB Clinician:     EDC: Estimated Date of Delivery: 7/20/20    Gestational Age:39w5d    Antepartum complications: none    Date of Delivery: 7/18/2020   Time of Delivery: 4:28 AM     Delivered By:  Jessica Moya     Delivery Type: Vaginal, Spontaneous  "     Tubal Ligation: n/a    Baby:female  infant;   Apgar:  8   @ 1 minute /   Apgar:  9   @ 5 minutes   Weight: 3414 g (7 lb 8.4 oz)      Anesthesia: Epidural      Intrapartum complications: None    [unfilled]       Lab Results   Component Value Date    ABO O 2020    RH Positive 2020        Lab Results   Component Value Date    HGB 8.9 (L) 2020    HCT 27.9 (L) 2020         Discharge Date: 2020; Discharge Time: 10:21        Plan:    Follow-up appointment with [facility] in 3 weeks.      Jessica Moya DO  2020  10:21      Electronically signed by Jesisca Moya DO at 20 1021

## 2020-07-20 NOTE — DISCHARGE INSTR - APPOINTMENTS
You have an appointment with Ingrid Yousif/[facility] on Monday, August 17, 2020 at 9:30 a.m.  You can reach Ingrid at [phone].

## 2020-07-20 NOTE — DISCHARGE SUMMARY
FARA Barrientos  Delivery Discharge Summary    Primary OB Clinician:     EDC: Estimated Date of Delivery: 20    Gestational Age:39w5d    Antepartum complications: none    Date of Delivery: 2020   Time of Delivery: 4:28 AM     Delivered By:  Jessica Moya     Delivery Type: Vaginal, Spontaneous      Tubal Ligation: n/a    Baby:female  infant;   Apgar:  8   @ 1 minute /   Apgar:  9   @ 5 minutes   Weight: 3414 g (7 lb 8.4 oz)      Anesthesia: Epidural      Intrapartum complications: None    [unfilled]       Lab Results   Component Value Date    ABO O 2020    RH Positive 2020        Lab Results   Component Value Date    HGB 8.9 (L) 2020    HCT 27.9 (L) 2020         Discharge Date: 2020; Discharge Time: 10:21        Plan:    Follow-up appointment with [facility] in 3 weeks.      Jessica Moya DO  2020  10:21

## 2020-07-22 ENCOUNTER — DOCUMENTATION (OUTPATIENT)
Dept: LABOR AND DELIVERY | Facility: HOSPITAL | Age: 20
End: 2020-07-22

## 2020-07-22 NOTE — PROGRESS NOTES
OB Progress Note      Hospital Course: . Patient was admitted on 20 for IOL @ 39.5 weeks. Patient doing well, +FM, denies VB/LOF/occ ctx.    signs in last 24 hours:    Vital Signs Range for the last 24 hours                    Radiology     Imaging Results (Last 24 Hours)     ** No results found for the last 24 hours. **           Labs     Lab Results (last 24 hours)     ** No results found for the last 24 hours. **            Review of Systems    The following systems were reviewed and negative;  ENT, respiratory, cardiovascular, gastrointestinal, genitourinary, breast, endocrine and allergies / immunologic.      Physical Exam:      General Appearance:    Alert, cooperative, in no acute distress   Head:    Normocephalic, without obvious abnormality, atraumatic   Eyes:            Lids and lashes normal, conjunctivae and sclerae normal, no   icterus, no pallor, corneas clear, PERRLA   Ears:    Ears appear intact with no abnormalities noted   Throat:   No oral lesions, no thrush, oral mucosa moist   Neck:   No adenopathy, supple, trachea midline, no thyromegaly, no     carotid bruit, no JVD   Back:     No kyphosis present, no scoliosis present, no skin lesions,       erythema or scars, no tenderness to percussion or                   palpation,   range of motion normal   Lungs:     Clear to auscultation,respirations regular, even and                   unlabored    Heart:    Regular rhythm and normal rate, normal S1 and S2, no            murmur, no gallop, no rub, no click   Breast Exam:    Deferred   Abdomen:     Normal bowel sounds, no masses, no organomegaly, soft        non-tender, gravid uterus, no guarding, no rebound                 tenderness   Genitalia:    Deferred   Extremities:   Moves all extremities well, no edema, no cyanosis, no              redness   Pulses:   Pulses palpable and equal bilaterally   Skin:   No bleeding, bruising or rash   Lymph nodes:   No palpable adenopathy   Neurologic:    Cranial nerves 2 - 12 grossly intact, sensation intact, DTR        present and equal bilaterally                 Assessment:  1.  Patient doing well, continue pitocin and s/p AROM.    Plan:  1. Will continue IOL.      Jessica Moya DO  07/17/2020  11:30